# Patient Record
Sex: FEMALE | Race: OTHER | NOT HISPANIC OR LATINO | URBAN - METROPOLITAN AREA
[De-identification: names, ages, dates, MRNs, and addresses within clinical notes are randomized per-mention and may not be internally consistent; named-entity substitution may affect disease eponyms.]

---

## 2017-06-22 ENCOUNTER — APPOINTMENT (RX ONLY)
Dept: URBAN - METROPOLITAN AREA TELEMEDICINE 2 | Facility: TELEMEDICINE | Age: 32
Setting detail: DERMATOLOGY
End: 2017-06-22

## 2017-06-22 DIAGNOSIS — L30.0 NUMMULAR DERMATITIS: ICD-10-CM | Status: INADEQUATELY CONTROLLED

## 2017-06-22 DIAGNOSIS — L81.0 POSTINFLAMMATORY HYPERPIGMENTATION: ICD-10-CM

## 2017-06-22 PROCEDURE — ? PATIENT SPECIFIC COUNSELING

## 2017-06-22 PROCEDURE — ? PRESCRIPTION

## 2017-06-22 PROCEDURE — 99213 OFFICE O/P EST LOW 20 MIN: CPT

## 2017-06-22 PROCEDURE — ? COUNSELING

## 2017-06-22 RX ORDER — TRIAMCINOLONE ACETONIDE 0.1 %
1 APPLICATION OINTMENT (GRAM) TOPICAL TWICE DAILY
Qty: 1 | Refills: 2 | Status: ERX | COMMUNITY
Start: 2017-06-22

## 2017-06-22 RX ADMIN — Medication 1 APPLICATION: at 00:00

## 2017-06-22 ASSESSMENT — LOCATION DETAILED DESCRIPTION DERM
LOCATION DETAILED: LEFT ANTERIOR DISTAL THIGH
LOCATION DETAILED: RIGHT MIDDLE DISTAL INTERPHALANGEAL JOINT
LOCATION DETAILED: RIGHT VENTRAL PROXIMAL FOREARM
LOCATION DETAILED: RIGHT ANTERIOR DISTAL THIGH
LOCATION DETAILED: LEFT VENTRAL DISTAL FOREARM

## 2017-06-22 ASSESSMENT — LOCATION ZONE DERM
LOCATION ZONE: LEG
LOCATION ZONE: ARM
LOCATION ZONE: FINGER

## 2017-06-22 ASSESSMENT — LOCATION SIMPLE DESCRIPTION DERM
LOCATION SIMPLE: RIGHT MIDDLE FINGER
LOCATION SIMPLE: LEFT FOREARM
LOCATION SIMPLE: LEFT THIGH
LOCATION SIMPLE: RIGHT THIGH
LOCATION SIMPLE: RIGHT FOREARM

## 2017-06-22 NOTE — PROCEDURE: PATIENT SPECIFIC COUNSELING
Detail Level: Zone
-take a half tablet of the oral steroid for one week, then take a quarter for one week, then stop medication.\\n-may continue oral anti itch medication if needed(may take otc Zyrtec)\\n-t/c bx if rash persist or worsens \\n-will start using topical steroid\\n-start using moisturizer daily \\n-reassured patient that no fungus is present at today's visit

## 2017-06-22 NOTE — HPI: SKIN LESION
How Severe Is Your Skin Lesion?: moderate
Has Your Skin Lesion Been Treated?: not been treated
Is This A New Presentation, Or A Follow-Up?: Skin Lesion
Additional History: -the patient states she went to [place] and saw her doctor who prescribed her anti fungal medications \\n-she states she has been using the medications but as soon as she stops using them her rash/lesion comes back

## 2017-07-06 ENCOUNTER — APPOINTMENT (RX ONLY)
Dept: URBAN - METROPOLITAN AREA TELEMEDICINE 2 | Facility: TELEMEDICINE | Age: 32
Setting detail: DERMATOLOGY
End: 2017-07-06

## 2017-07-06 DIAGNOSIS — L85.3 XEROSIS CUTIS: ICD-10-CM

## 2017-07-06 DIAGNOSIS — L73.9 FOLLICULAR DISORDER, UNSPECIFIED: ICD-10-CM

## 2017-07-06 DIAGNOSIS — L30.0 NUMMULAR DERMATITIS: ICD-10-CM | Status: INADEQUATELY CONTROLLED

## 2017-07-06 DIAGNOSIS — L70.0 ACNE VULGARIS: ICD-10-CM | Status: WORSENING

## 2017-07-06 DIAGNOSIS — L738 OTHER SPECIFIED DISEASES OF HAIR AND HAIR FOLLICLES: ICD-10-CM

## 2017-07-06 DIAGNOSIS — L663 OTHER SPECIFIED DISEASES OF HAIR AND HAIR FOLLICLES: ICD-10-CM

## 2017-07-06 PROBLEM — L02.425 FURUNCLE OF RIGHT LOWER LIMB: Status: ACTIVE | Noted: 2017-07-06

## 2017-07-06 PROBLEM — L02.426 FURUNCLE OF LEFT LOWER LIMB: Status: ACTIVE | Noted: 2017-07-06

## 2017-07-06 PROCEDURE — 99213 OFFICE O/P EST LOW 20 MIN: CPT

## 2017-07-06 PROCEDURE — ? COUNSELING

## 2017-07-06 PROCEDURE — ? PRESCRIPTION

## 2017-07-06 PROCEDURE — ? OTHER

## 2017-07-06 RX ORDER — CLINDAMYCIN PHOSPHATE 10 MG/ML
1 APPLICATION LOTION TOPICAL BID
Qty: 1 | Refills: 5 | Status: ERX | COMMUNITY
Start: 2017-07-06

## 2017-07-06 RX ADMIN — CLINDAMYCIN PHOSPHATE 1 APPLICATION: 10 LOTION TOPICAL at 00:00

## 2017-07-06 ASSESSMENT — LOCATION SIMPLE DESCRIPTION DERM
LOCATION SIMPLE: CHEST
LOCATION SIMPLE: RIGHT MIDDLE FINGER
LOCATION SIMPLE: RIGHT PRETIBIAL REGION
LOCATION SIMPLE: LEFT PRETIBIAL REGION
LOCATION SIMPLE: RIGHT CHEEK
LOCATION SIMPLE: LEFT CHEEK

## 2017-07-06 ASSESSMENT — LOCATION ZONE DERM
LOCATION ZONE: LEG
LOCATION ZONE: FACE
LOCATION ZONE: FINGER
LOCATION ZONE: TRUNK

## 2017-07-06 ASSESSMENT — LOCATION DETAILED DESCRIPTION DERM
LOCATION DETAILED: RIGHT CENTRAL MALAR CHEEK
LOCATION DETAILED: RIGHT MIDDLE FINGERNAIL
LOCATION DETAILED: LEFT INFERIOR CENTRAL MALAR CHEEK
LOCATION DETAILED: LEFT PROXIMAL PRETIBIAL REGION
LOCATION DETAILED: STERNAL NOTCH
LOCATION DETAILED: RIGHT PROXIMAL PRETIBIAL REGION

## 2017-07-06 NOTE — PROCEDURE: OTHER
Note Text (......Xxx Chief Complaint.): This diagnosis correlates with the
Other (Free Text): -stop using TAC ointment\\n-moisturize daily\\n- start clindamycin
Detail Level: Zone
Other (Free Text): - stop using TAC ointment on face\\n-moisturize daily\\n-may use clindamycin once to twice daily
Other (Free Text): -moisturize 1-2 times daily\\n-may take otc antihistamine to help with itching
Other (Free Text): -may continue using TAC ointment twice a day as needed for flares\\n-moisturize daily\\n-Has stopped prednisone\\n-Not flaring, mostly PIH

## 2017-07-13 ENCOUNTER — APPOINTMENT (RX ONLY)
Dept: URBAN - METROPOLITAN AREA TELEMEDICINE 2 | Facility: TELEMEDICINE | Age: 32
Setting detail: DERMATOLOGY
End: 2017-07-13

## 2017-07-13 DIAGNOSIS — Z71.89 OTHER SPECIFIED COUNSELING: ICD-10-CM

## 2017-07-13 DIAGNOSIS — L30.0 NUMMULAR DERMATITIS: ICD-10-CM | Status: IMPROVED

## 2017-07-13 DIAGNOSIS — L81.0 POSTINFLAMMATORY HYPERPIGMENTATION: ICD-10-CM

## 2017-07-13 DIAGNOSIS — L29.8 OTHER PRURITUS: ICD-10-CM

## 2017-07-13 DIAGNOSIS — L71.8 OTHER ROSACEA: ICD-10-CM

## 2017-07-13 DIAGNOSIS — L29.89 OTHER PRURITUS: ICD-10-CM

## 2017-07-13 PROCEDURE — ? PRESCRIPTION

## 2017-07-13 PROCEDURE — ? RECOMMENDATIONS

## 2017-07-13 PROCEDURE — ? SUNSCREEN RECOMMENDATIONS

## 2017-07-13 PROCEDURE — ? COUNSELING

## 2017-07-13 PROCEDURE — ? TREATMENT REGIMEN

## 2017-07-13 PROCEDURE — 99213 OFFICE O/P EST LOW 20 MIN: CPT

## 2017-07-13 RX ORDER — DOXYCYCLINE 100 MG/1
1 CAPSULE ORAL ONCE DAILY
Qty: 60 | Refills: 3 | Status: ERX | COMMUNITY
Start: 2017-07-13

## 2017-07-13 RX ADMIN — DOXYCYCLINE 1: 100 CAPSULE ORAL at 21:59

## 2017-07-13 ASSESSMENT — LOCATION DETAILED DESCRIPTION DERM
LOCATION DETAILED: RIGHT INFERIOR CENTRAL MALAR CHEEK
LOCATION DETAILED: RIGHT SUPERIOR MEDIAL UPPER BACK
LOCATION DETAILED: RIGHT PROXIMAL PRETIBIAL REGION
LOCATION DETAILED: LEFT INFERIOR CENTRAL MALAR CHEEK

## 2017-07-13 ASSESSMENT — LOCATION SIMPLE DESCRIPTION DERM
LOCATION SIMPLE: RIGHT CHEEK
LOCATION SIMPLE: RIGHT UPPER BACK
LOCATION SIMPLE: RIGHT PRETIBIAL REGION
LOCATION SIMPLE: LEFT CHEEK

## 2017-07-13 ASSESSMENT — LOCATION ZONE DERM
LOCATION ZONE: FACE
LOCATION ZONE: LEG
LOCATION ZONE: TRUNK

## 2017-07-13 NOTE — PROCEDURE: TREATMENT REGIMEN
Initiate Treatment: Doxycycline 100mg - BID
Detail Level: Zone
Discontinue Regimen: Clindamycin lotion
Plan: Will consider topicals once inflammation is more calm\\nSince very inflamed start with doxy 100 mg bid, will taper off and switch to low dose doxy once stable\\nDisc s/e and not to get pregnant
Otc Regimen: SUNSCREEN

## 2017-07-13 NOTE — PROCEDURE: RECOMMENDATIONS
Detail Level: Zone
Recommendations (Free Text): Wide brimmed hat
Recommendation Preamble: The following recommendations were made during the visit: